# Patient Record
Sex: FEMALE | Race: WHITE | NOT HISPANIC OR LATINO | URBAN - METROPOLITAN AREA
[De-identification: names, ages, dates, MRNs, and addresses within clinical notes are randomized per-mention and may not be internally consistent; named-entity substitution may affect disease eponyms.]

---

## 2022-12-06 ENCOUNTER — APPOINTMENT (RX ONLY)
Dept: URBAN - METROPOLITAN AREA CLINIC 116 | Facility: CLINIC | Age: 52
Setting detail: DERMATOLOGY
End: 2022-12-06

## 2022-12-06 DIAGNOSIS — Z41.9 ENCOUNTER FOR PROCEDURE FOR PURPOSES OTHER THAN REMEDYING HEALTH STATE, UNSPECIFIED: ICD-10-CM

## 2022-12-06 PROCEDURE — ? MEDICAL CONSULTATION: COOLSCULPTING

## 2022-12-13 ENCOUNTER — APPOINTMENT (RX ONLY)
Dept: URBAN - METROPOLITAN AREA CLINIC 116 | Facility: CLINIC | Age: 52
Setting detail: DERMATOLOGY
End: 2022-12-13

## 2022-12-13 DIAGNOSIS — Z41.9 ENCOUNTER FOR PROCEDURE FOR PURPOSES OTHER THAN REMEDYING HEALTH STATE, UNSPECIFIED: ICD-10-CM

## 2022-12-13 PROCEDURE — ? INVENTORY

## 2022-12-13 PROCEDURE — ? COOLSCULPTING

## 2022-12-13 NOTE — PROCEDURE: COOLSCULPTING
Number Of Cycles: 1
Applicator Size: standard applicator
Time (Minutes - Will Only Render If Nonzero): [address]
Post Treatment: After treatment, the suction was turned off, and the applicator was removed from the skin.
Time (Minutes - Will Only Render If Nonzero): 35
Time (Minutes - Will Only Render If Nonzero): 0
Suction Settings: The suction settings were per protocol.
Price (Use Numbers Only, No Special Characters Or $): 2100
Time (Minutes - Will Only Render If Nonzero): 75
Consent: Written consent obtained, risks reviewed including, but not limited to, blistering from suction, darker or lighter pigmentary change, bruising, and/or need for multiple treatments.
Location 4: mid abdomen (right)
Applicator Size: [address]
Location 1: lower abdomen (left)
Device: Coolsculpting
Location 3: mid abdomen (left)
Applicator Size: CoolAdvantage Curve
Detail Level: Zone
Location 2: lower abdomen (right)
Intro: Prior to treatment, the area was cleaned with alcohol and marked out with a marking pen. The gel sheet was then applied uniformly. The applicator was applied to the skin with good contact and suction.